# Patient Record
Sex: FEMALE | Race: WHITE | NOT HISPANIC OR LATINO | URBAN - METROPOLITAN AREA
[De-identification: names, ages, dates, MRNs, and addresses within clinical notes are randomized per-mention and may not be internally consistent; named-entity substitution may affect disease eponyms.]

---

## 2021-04-03 ENCOUNTER — LAB OUTSIDE AN ENCOUNTER (OUTPATIENT)
Dept: URBAN - METROPOLITAN AREA CLINIC 124 | Facility: CLINIC | Age: 21
End: 2021-04-03

## 2021-04-05 ENCOUNTER — OFFICE VISIT (OUTPATIENT)
Dept: URBAN - METROPOLITAN AREA CLINIC 124 | Facility: CLINIC | Age: 21
End: 2021-04-05
Payer: COMMERCIAL

## 2021-04-05 VITALS
HEIGHT: 70 IN | TEMPERATURE: 96 F | WEIGHT: 146.8 LBS | DIASTOLIC BLOOD PRESSURE: 74 MMHG | BODY MASS INDEX: 21.02 KG/M2 | SYSTOLIC BLOOD PRESSURE: 122 MMHG

## 2021-04-05 DIAGNOSIS — R11.2 NON-INTRACTABLE VOMITING WITH NAUSEA, UNSPECIFIED VOMITING TYPE: ICD-10-CM

## 2021-04-05 DIAGNOSIS — F31.9 BIPOLAR DEPRESSION: ICD-10-CM

## 2021-04-05 PROBLEM — 36923009: Status: ACTIVE | Noted: 2021-04-05

## 2021-04-05 PROBLEM — 231504006: Status: ACTIVE | Noted: 2021-04-05

## 2021-04-05 PROCEDURE — 99204 OFFICE O/P NEW MOD 45 MIN: CPT | Performed by: INTERNAL MEDICINE

## 2021-04-05 RX ORDER — PANTOPRAZOLE SODIUM 40 MG/1
1 TABLET TABLET, DELAYED RELEASE ORAL ONCE A DAY
Qty: 30 | Refills: 1 | OUTPATIENT
Start: 2021-04-05

## 2021-04-05 NOTE — HPI-TODAY'S VISIT:
19 yo female referred by Dr Phillip Amador for a GI consultation of nausea and vomiting and a copy of this note will be sent to the referring physician. Pt says she has had nausea often and since November was 3 x a week. Pt was at home around thanksgiving break and she  had a flu she thinks- was not diagnosed formally. She had temp, aches, cough, fatigue, and n/v/d- she did  not keep anything down for 2 days then she got better and a few days after this she had persistent n/v about 3x a week. Now it has occurred once a week or once every other week. Pt gets nause with food but does not always throw up. Pt has been on zofran but constipated and it does help the nausea but its not gone.x Prior to illness she went 1-2x a day since the illness she softer stools 3-4x a day unitl she started zofran. Pt had normal bloodwork with Dr Amador. Pt prior to illness seemed to get flu about once a year or once every other year. 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 She will be taking a year off.

## 2021-06-17 ENCOUNTER — OFFICE VISIT (OUTPATIENT)
Dept: URBAN - METROPOLITAN AREA SURGERY CENTER 16 | Facility: SURGERY CENTER | Age: 21
End: 2021-06-17
Payer: COMMERCIAL

## 2021-06-17 VITALS — WEIGHT: 150 LBS | HEIGHT: 70 IN | BODY MASS INDEX: 21.47 KG/M2

## 2021-06-17 DIAGNOSIS — R11.2 ACUTE NAUSEA WITH NONBILIOUS VOMITING: ICD-10-CM

## 2021-06-17 DIAGNOSIS — K31.89 ACQUIRED DEFORMITY OF DUODENUM: ICD-10-CM

## 2021-06-17 DIAGNOSIS — R10.13 ABDOMINAL DISCOMFORT, EPIGASTRIC: ICD-10-CM

## 2021-06-17 DIAGNOSIS — K22.8 COLUMNAR-LINED ESOPHAGUS: ICD-10-CM

## 2021-06-17 PROCEDURE — G8907 PT DOC NO EVENTS ON DISCHARG: HCPCS | Performed by: INTERNAL MEDICINE

## 2021-06-17 PROCEDURE — 43239 EGD BIOPSY SINGLE/MULTIPLE: CPT | Performed by: INTERNAL MEDICINE

## 2021-06-17 RX ORDER — PANTOPRAZOLE SODIUM 40 MG/1
1 TABLET TABLET, DELAYED RELEASE ORAL ONCE A DAY
Qty: 30 | Refills: 1 | Status: ACTIVE | COMMUNITY
Start: 2021-04-05

## 2021-06-28 ENCOUNTER — OFFICE VISIT (OUTPATIENT)
Dept: URBAN - METROPOLITAN AREA TELEHEALTH 2 | Facility: TELEHEALTH | Age: 21
End: 2021-06-28
Payer: COMMERCIAL

## 2021-06-28 DIAGNOSIS — R11.2 NON-INTRACTABLE VOMITING WITH NAUSEA, UNSPECIFIED VOMITING TYPE: ICD-10-CM

## 2021-06-28 DIAGNOSIS — K44.9 HIATAL HERNIA: ICD-10-CM

## 2021-06-28 DIAGNOSIS — R19.5 LOOSE STOOLS: ICD-10-CM

## 2021-06-28 DIAGNOSIS — K21.9 GERD WITHOUT ESOPHAGITIS: ICD-10-CM

## 2021-06-28 PROCEDURE — 99214 OFFICE O/P EST MOD 30 MIN: CPT | Performed by: INTERNAL MEDICINE

## 2021-06-28 RX ORDER — PANTOPRAZOLE SODIUM 40 MG/1
1 TABLET TABLET, DELAYED RELEASE ORAL ONCE A DAY
Qty: 30 | Refills: 1 | OUTPATIENT

## 2021-06-28 RX ORDER — PANTOPRAZOLE SODIUM 40 MG/1
1 TABLET TABLET, DELAYED RELEASE ORAL ONCE A DAY
Qty: 30 | Refills: 1 | Status: ACTIVE | COMMUNITY
Start: 2021-04-05

## 2021-06-28 NOTE — HPI-TODAY'S VISIT:
20yoF seen in April for nausea and vomiting. Around thanksgiving break and she "the flu" she thinks--had fevers, n/v/d. Since that time has had N/V, decreased frequency over time but still 2/week on average. No GERD sx.  EGD 6/2021 gastritis, small HH; bx neg HP, EOE, celiac; +basaloid hyperplasia c/w reflux Put on pantopazole and feels better but still has N/V about 2/week, worse with alcohol and after eating but no specific foods. At times has post-prandial fullness. Never had any GI sx prior to thanksgiving " flu. Since the flu she also has had more frequent and at times looser stools up to 4-5 times/day. No hematochezia or melena. No abd pain. She lost weight when sx started but now gained weight back. Still feels at times it is hard to eat as food is not appetizing and no appetite.  Mom with GERD; NO IBD or GI malignancy; Smokes marijuana every 2 days seems to help and not worsen sx

## 2021-07-25 ENCOUNTER — ERX REFILL RESPONSE (OUTPATIENT)
Dept: URBAN - METROPOLITAN AREA CLINIC 92 | Facility: CLINIC | Age: 21
End: 2021-07-25

## 2021-07-25 RX ORDER — PANTOPRAZOLE SODIUM 40 MG/1
TAKE 1 TABLET BY MOUTH EVERY DAY TABLET, DELAYED RELEASE ORAL
Qty: 30 TABLET | Refills: 2 | OUTPATIENT

## 2021-07-25 RX ORDER — PANTOPRAZOLE SODIUM 40 MG/1
1 TABLET TABLET, DELAYED RELEASE ORAL ONCE A DAY
Qty: 30 | Refills: 1 | OUTPATIENT

## 2021-08-05 ENCOUNTER — WEB ENCOUNTER (OUTPATIENT)
Dept: URBAN - METROPOLITAN AREA CLINIC 124 | Facility: CLINIC | Age: 21
End: 2021-08-05

## 2021-08-24 ENCOUNTER — ERX REFILL RESPONSE (OUTPATIENT)
Dept: URBAN - METROPOLITAN AREA CLINIC 92 | Facility: CLINIC | Age: 21
End: 2021-08-24

## 2021-08-24 RX ORDER — PANTOPRAZOLE SODIUM 40 MG/1
TAKE 1 TABLET BY MOUTH EVERY DAY 30 TABLET, DELAYED RELEASE ORAL
Qty: 90 TABLET | Refills: 1 | OUTPATIENT

## 2021-08-24 RX ORDER — PANTOPRAZOLE SODIUM 40 MG/1
TAKE 1 TABLET BY MOUTH EVERY DAY TABLET, DELAYED RELEASE ORAL
Qty: 30 TABLET | Refills: 2 | OUTPATIENT

## 2021-08-30 ENCOUNTER — DASHBOARD ENCOUNTERS (OUTPATIENT)
Age: 21
End: 2021-08-30

## 2021-08-31 ENCOUNTER — OFFICE VISIT (OUTPATIENT)
Dept: URBAN - METROPOLITAN AREA TELEHEALTH 2 | Facility: TELEHEALTH | Age: 21
End: 2021-08-31
Payer: COMMERCIAL

## 2021-08-31 VITALS — WEIGHT: 150 LBS | HEIGHT: 70 IN | BODY MASS INDEX: 21.47 KG/M2

## 2021-08-31 DIAGNOSIS — R11.2 NON-INTRACTABLE VOMITING WITH NAUSEA, UNSPECIFIED VOMITING TYPE: ICD-10-CM

## 2021-08-31 DIAGNOSIS — K44.9 HIATAL HERNIA: ICD-10-CM

## 2021-08-31 DIAGNOSIS — K21.9 GERD WITHOUT ESOPHAGITIS: ICD-10-CM

## 2021-08-31 DIAGNOSIS — R19.5 LOOSE STOOLS: ICD-10-CM

## 2021-08-31 PROBLEM — 235675006: Status: ACTIVE | Noted: 2021-08-30

## 2021-08-31 PROBLEM — 266435005: Status: ACTIVE | Noted: 2021-06-28

## 2021-08-31 PROBLEM — 767631007: Status: ACTIVE | Noted: 2021-04-05

## 2021-08-31 PROCEDURE — 99213 OFFICE O/P EST LOW 20 MIN: CPT | Performed by: INTERNAL MEDICINE

## 2021-08-31 RX ORDER — PANTOPRAZOLE SODIUM 40 MG/1
TAKE 1 TABLET BY MOUTH EVERY DAY 30 TABLET, DELAYED RELEASE ORAL
Qty: 90 TABLET | Refills: 1 | Status: ON HOLD | COMMUNITY

## 2021-08-31 NOTE — HPI-TODAY'S VISIT:
20yoF seen in April for nausea and vomiting. Around thanksgiving break she had  "the flu" --had fevers, n/v/d. Since that time has had N/V, decreased frequency over time but still 2/week on average. No GERD sx.    EGD 6/2021 gastritis, small HH; bx neg HP, EOE, celiac; +basaloid hyperplasia c/w reflux   Put on pantopazole with decreased frequency of N/V but still occuring and still post-prandial fullness. GES + gastroparesis with T1/2 of 79m (normal <60) She changed her diet to 4 meals/day and low fat and fiber and has not had any vomiting in the last month and nausea is rare. 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   Since the flu she also has had more frequent and at times looser stools, initially 5/day, now 3-4/day and slightly more formed on probiotic. No hematochezia or melena. No abd pain. She lost weight when sx started but now gained weight back.   Mom with GERD; NO IBD or GI malignancy; Smokes marijuana every 2 days seems to help and not worsen sx

## 2021-11-08 ENCOUNTER — TELEPHONE ENCOUNTER (OUTPATIENT)
Dept: URBAN - METROPOLITAN AREA CLINIC 92 | Facility: CLINIC | Age: 21
End: 2021-11-08

## 2021-11-08 ENCOUNTER — WEB ENCOUNTER (OUTPATIENT)
Dept: URBAN - METROPOLITAN AREA CLINIC 124 | Facility: CLINIC | Age: 21
End: 2021-11-08

## 2021-11-08 RX ORDER — PANTOPRAZOLE SODIUM 40 MG/1
TAKE 1 TABLET BY MOUTH EVERY DAY 30 TABLET, DELAYED RELEASE ORAL
Qty: 90 TABLET | Refills: 1 | COMMUNITY

## 2021-11-08 NOTE — HPI-TODAY'S VISIT:
I am writing this on behalf of our patient Latricia Rodriguez 2000.  Latricia was diagnosed with gastroparesis earlier this year after having symptoms since October 2020. She was diagnosed with this condition based on an abnormal gastric emptying scan. Gastroparesis is  a chronic GI condtition that can cause symptoms inlcuding abdominal pain, nausea and vomiting. She is using medications and diet to control her disease but this disease can still cause flares which can affect her ability to attend classes, complete work in a timely manner and she may need time off of classes when her disease is severely flaring.    For these reasons, please allow accomodations to our patient as needed.  Thank you   BERNIE Larose MD

## 2022-02-18 ENCOUNTER — OFFICE VISIT (OUTPATIENT)
Dept: URBAN - METROPOLITAN AREA TELEHEALTH 2 | Facility: TELEHEALTH | Age: 22
End: 2022-02-18

## 2024-05-08 ENCOUNTER — TELEPHONE ENCOUNTER (OUTPATIENT)
Dept: URBAN - METROPOLITAN AREA CLINIC 23 | Facility: CLINIC | Age: 24
End: 2024-05-08